# Patient Record
Sex: MALE | Race: WHITE
[De-identification: names, ages, dates, MRNs, and addresses within clinical notes are randomized per-mention and may not be internally consistent; named-entity substitution may affect disease eponyms.]

---
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
## 2019-12-13 ENCOUNTER — HOSPITAL ENCOUNTER (EMERGENCY)
Dept: HOSPITAL 74 - FER | Age: 19
Discharge: HOME | End: 2019-12-13
Payer: COMMERCIAL

## 2019-12-13 VITALS — DIASTOLIC BLOOD PRESSURE: 75 MMHG | HEART RATE: 72 BPM | SYSTOLIC BLOOD PRESSURE: 123 MMHG | TEMPERATURE: 98.5 F

## 2019-12-13 VITALS — BODY MASS INDEX: 25 KG/M2

## 2019-12-13 DIAGNOSIS — R10.84: Primary | ICD-10-CM

## 2019-12-13 DIAGNOSIS — Z88.0: ICD-10-CM

## 2019-12-13 LAB
ALBUMIN SERPL-MCNC: 3.8 G/DL (ref 3.4–5)
ALP SERPL-CCNC: 49 U/L (ref 45–117)
ALT SERPL-CCNC: 18 U/L (ref 13–61)
ANION GAP SERPL CALC-SCNC: 6 MMOL/L (ref 8–16)
AST SERPL-CCNC: 21 U/L (ref 15–37)
BASOPHILS # BLD: 0.4 % (ref 0–2)
BILIRUB SERPL-MCNC: 1.4 MG/DL (ref 0.2–1)
BUN SERPL-MCNC: 22 MG/DL (ref 7–18)
CALCIUM SERPL-MCNC: 8.6 MG/DL (ref 8.5–10)
CHLORIDE SERPL-SCNC: 105 MMOL/L (ref 98–107)
CO2 SERPL-SCNC: 24 MMOL/L (ref 21–32)
CREAT SERPL-MCNC: 1 MG/DL (ref 0.55–1.3)
DEPRECATED RDW RBC AUTO: 12.1 % (ref 11.9–15.9)
EOSINOPHIL # BLD: 0.4 % (ref 0–4.5)
GLUCOSE SERPL-MCNC: 81 MG/DL (ref 74–106)
HCT VFR BLD CALC: 50.6 % (ref 35.4–49)
HGB BLD-MCNC: 17 GM/DL (ref 11.7–16.9)
LYMPHOCYTES # BLD: 8.9 % (ref 8–40)
MCH RBC QN AUTO: 31.5 PG (ref 25.7–33.7)
MCHC RBC AUTO-ENTMCNC: 33.5 G/DL (ref 32–35.9)
MCV RBC: 94.1 FL (ref 80–96)
MONOCYTES # BLD AUTO: 9.6 % (ref 3.8–10.2)
NEUTROPHILS # BLD: 80.7 % (ref 42.8–82.8)
PLATELET # BLD AUTO: 194 K/MM3 (ref 134–434)
PMV BLD: 9.3 FL (ref 7.5–11.1)
POTASSIUM SERPLBLD-SCNC: 3.7 MMOL/L (ref 3.5–5.1)
PROT SERPL-MCNC: 6.4 G/DL (ref 6.4–8.2)
RBC # BLD AUTO: 5.38 M/MM3 (ref 4–5.6)
SODIUM SERPL-SCNC: 135 MMOL/L (ref 136–145)
WBC # BLD AUTO: 12.1 K/MM3 (ref 4–10.8)

## 2019-12-13 PROCEDURE — 3E033NZ INTRODUCTION OF ANALGESICS, HYPNOTICS, SEDATIVES INTO PERIPHERAL VEIN, PERCUTANEOUS APPROACH: ICD-10-PCS

## 2019-12-13 PROCEDURE — 3E033GC INTRODUCTION OF OTHER THERAPEUTIC SUBSTANCE INTO PERIPHERAL VEIN, PERCUTANEOUS APPROACH: ICD-10-PCS

## 2019-12-13 PROCEDURE — 3E0333Z INTRODUCTION OF ANTI-INFLAMMATORY INTO PERIPHERAL VEIN, PERCUTANEOUS APPROACH: ICD-10-PCS

## 2019-12-13 NOTE — PDOC
*Physical Exam





- Vital Signs


 Last Vital Signs











Temp Pulse Resp BP Pulse Ox


 


 98.5 F   72   15   123/75   98 


 


 12/13/19 19:30  12/13/19 19:30  12/13/19 19:30  12/13/19 19:30  12/13/19 19:30














ED Treatment Course





- LABORATORY


CBC & Chemistry Diagram: 


 12/13/19 15:45





 12/13/19 16:15





- ADDITIONAL ORDERS


Additional order review: 


 Laboratory  Results











  12/13/19 12/13/19





  16:15 16:15


 


Sodium   135 L


 


Potassium   3.7


 


Chloride   105


 


Carbon Dioxide   24


 


Anion Gap   6 L


 


BUN   22.0 H


 


Creatinine   1.0


 


Est GFR (CKD-EPI)AfAm   124.14


 


Est GFR (CKD-EPI)NonAf   107.11


 


Random Glucose   81


 


Calcium   8.6


 


Total Bilirubin   1.4 H


 


AST   21


 


ALT   18


 


Alkaline Phosphatase   49


 


Total Protein   6.4


 


Albumin   3.8


 


Lipase  93 








 











  12/13/19





  15:45


 


RBC  5.38


 


MCV  94.1


 


MCHC  33.5


 


RDW  12.1


 


MPV  9.3


 


Neutrophils %  80.7


 


Lymphocytes %  8.9  D


 


Monocytes %  9.6  D


 


Eosinophils %  0.4  D


 


Basophils %  0.4














- Medications


Given in the ED: 


ED Medications














Discontinued Medications














Generic Name Dose Route Start Last Admin





  Trade Name Freq  PRN Reason Stop Dose Admin


 


Al Hydroxide/Mg Hydroxide  30 ml  12/13/19 15:12  12/13/19 15:17





  Mylanta Oral Suspension -  PO  12/13/19 15:13  30 ml





  ONCE ONE   Administration





     





     





     





     


 


Famotidine/Sodium Chloride  20 mg in 50 mls @ 100 mls/hr  12/13/19 14:16  12/13/ 19 14:30





  Pepcid 20 Mg Premixed Ivpb -  IVPB  12/13/19 14:45  100 mls/hr





  ONCE ONE   Administration





     





     





     





     


 


Ketorolac Tromethamine  15 mg  12/13/19 15:35  12/13/19 15:50





  Toradol Injection -  IVPUSH  12/13/19 15:36  15 mg





  ONCE ONE   Administration





     





     





     





     


 


Morphine Sulfate  4 mg  12/13/19 17:59  12/13/19 18:20





  Morphine Injection -  IVPUSH  12/13/19 18:00  4 mg





  ONCE ONE   Administration





     





     





     





     


 


Ondansetron HCl  4 mg  12/13/19 18:12  12/13/19 18:15





  Zofran Injection  IVPUSH  12/13/19 18:13  4 mg





  ONCE ONE   Administration





     





     





     





     


 


Sodium Chloride  1,000 ml  12/13/19 14:16  12/13/19 14:30





  Normal Saline -  IV  12/13/19 14:17  1,000 ml





  ONCE ONE   Administration





     





     





     





     














ED Progress Note





- Progress Note


Progress Note: 





12/13/19 19:59


Care of this patient was transferred to me from Dr. Dugan at 1900 hrs.  

Patient is a 19-year-old male who comes in complaining of epigastric pain.  

Patient had a work-up that included a white count of 12.1 but no left shift.  

Patient's BUN and H&H were also somewhat elevated indicating he most likely was 

dry clinically


Patient received


A liter of fluid as well as Zofran and a H2 blocker.





Patient had a CAT scan that was read by the radiologist moderate fluid-filled 

stomach question recent food ingestion.  Increased fluid also within the colon 

and small bowel loops most likely secondary to a anterior colitis.





Patient symptoms most likely are secondary to a viral etiology.  Patient given 

copy of his CAT scan and discharged home.  Patient will follow-up with his 

primary care doctor.








Discharge





- Discharge Information


Problems reviewed: Yes


Clinical Impression/Diagnosis: 


 Viral gastroenteritis





Abdominal pain


Qualifiers:


 Abdominal location: generalized Qualified Code(s): R10.84 - Generalized 

abdominal pain





Condition: Good





- Admission


No





- Follow up/Referral


Referrals: 


Chelsea Urias [Primary Care Provider] - 





- Patient Discharge Instructions


Additional Instructions: 


For the pain take Tylenol 2 extra strength tablets as often as every 6 hours as 

needed.





For nausea take Zofran 1 tablet every 6-8 hours as needed I sent a prescription 

to your pharmacy for the Zofran.





If not improved within 48 hours follow-up with a primary care doctor.





Return to the emergency department immediately with ANY new, persistent or 

worsening symptoms.





Continue any medications as previously prescribed by your physician.





You should follow up with your primary doctor as soon as possible regarding 

today's emergency department visit.


.


Please make sure your doctor reviews the results of your emergency evaluation.





Thank you for coming to the   Emergency Department today for your care. It was 

a pleasure to see you today. Please note that your evaluation is INCOMPLETE 

until you  follow-up with your doctor. 





- Post Discharge Activity


Work/Back to School Note:  Back to School

## 2019-12-13 NOTE — PDOC
Documentation entered by Zeke Motley SCRIBE, acting as scribe for Kings Dugan MD.








Kings Dugan MD:  This documentation has been prepared by the Tolu medel Aiswarya, SCRIBE, under my direction and personally reviewed by me in 

its entirety.  I confirm that the documentation accurately reflects all work, 

treatment, procedures, and medical decision making performed by me.  





History of Present Illness





- General


Chief Complaint: Pain


Stated Complaint: STOMACH PAIN


Time Seen by Provider: 12/13/19 14:16


History Source: Patient


Exam Limitations: No Limitations





- History of Present Illness


Initial Comments: 





12/13/19 14:31





The patient is a 19 year old female, with no  significant PMH,  who presents to 

the emergency department with abdominal pain that began 4 hours ago. The 

patient states abdominal pain is located to the epigastric area and endorses 

associated symptoms of bloating and nausea. He also reports only having eggs 

and coffee today. Patient mentions that he had a similar pain one year ago and  

was seen by a gastroenterologist. The patient denies chest pain, shortness of 

breath, headache and dizziness. Denies fever, chills, vomiting, diarrhea and 

constipation.Denies dysuria, frequency, urgency and hematuria.





Allergies: NKDA


Past surgical history:None reported 


Social history: None reported


PCP: Chelsea Urias 








Past History





- Past Medical History


Allergies/Adverse Reactions: 


 Allergies











Allergy/AdvReac Type Severity Reaction Status Date / Time


 


Penicillins Allergy  Hives Verified 12/13/19 14:14











Home Medications: 


Ambulatory Orders





NK [No Known Home Medication]  12/13/19 








Asthma: No


Diabetes: No





- Immunization History


Immunization Up to Date: Yes





- Psycho Social/Smoking Cessation Hx


Smoking Status: No


Smoking History: Never smoked


Have you smoked in the past 12 months: No


Number of Cigarettes Smoked Daily: 0


Hx Alcohol Use: No


Drug/Substance Use Hx: No


Substance Use Type: None





**Review of Systems





- Review of Systems


Able to Perform ROS?: Yes


Comments:: 





12/13/19 14:31


GENERAL/CONSTITUTIONAL: No fever or chills. No weakness.


HEAD, EYES, EARS, NOSE AND THROAT: No change in vision. No ear pain or 

discharge. No sore throat.


CARDIOVASCULAR: No chest pain or shortness of breath.


RESPIRATORY: No cough, wheezing, or hemoptysis.


GASTROINTESTINAL: +nausea. No vomiting, diarrhea or constipation.


GENITOURINARY: No dysuria, frequency, or change in urination.


MUSCULOSKELETAL: No joint or muscle swelling or pain. No neck or back pain.


SKIN: No rash


NEUROLOGIC: No headache, vertigo, loss of consciousness, or change in strength/

sensation.


ENDOCRINE: No increased thirst. No abnormal weight change.


HEMATOLOGIC/LYMPHATIC: No anemia, easy bleeding, or history of blood clots.


ALLERGIC/IMMUNOLOGIC: No hives or skin allergy.








*Physical Exam





- Vital Signs


 Last Vital Signs











Temp Pulse Resp BP Pulse Ox


 


 98.0 F   76   16   140/100   98 


 


 12/13/19 14:12  12/13/19 14:12  12/13/19 14:12  12/13/19 14:12  12/13/19 14:12














- Physical Exam





12/13/19 14:31


GENERAL: Awake, alert, and fully oriented, in no acute distress


HEAD: No signs of trauma


EYES: PERRLA, EOMI, sclera anicteric, conjunctiva clear


ENT: Auricles normal inspection, hearing grossly normal, nares patent, 

oropharynx clear without exudates. Moist mucosa


NECK: Normal ROM, supple, no lymphadenopathy, JVD, or masses


LUNGS: Breath sounds equal, clear to auscultation bilaterally.  No wheezes, and 

no crackles


HEART: Regular rate and rhythm, normal S1 and S2, no murmurs, rubs or gallops


ABDOMEN: +epigastric tenderness. No guarding, no rebound.  No masses


EXTREMITIES: Normal range of motion, no edema.  No clubbing or cyanosis. No 

cords, erythema, or tenderness


NEUROLOGICAL: Cranial nerves II through XII grossly intact.  Normal speech, 

normal gait


SKIN: Warm, Dry, normal turgor, no rashes or lesions noted.











ED Treatment Course





- LABORATORY


CBC & Chemistry Diagram: 


 12/13/19 15:45





 12/13/19 16:15





- ADDITIONAL ORDERS


Additional order review: 


 Laboratory  Results











  12/13/19 12/13/19





  16:15 16:15


 


Sodium   135 L


 


Potassium   3.7


 


Chloride   105


 


Carbon Dioxide   24


 


Anion Gap   6 L


 


BUN   22.0 H


 


Creatinine   1.0


 


Est GFR (CKD-EPI)AfAm   124.14


 


Est GFR (CKD-EPI)NonAf   107.11


 


Random Glucose   81


 


Calcium   8.6


 


Total Bilirubin   1.4 H


 


AST   21


 


ALT   18


 


Alkaline Phosphatase   49


 


Total Protein   6.4


 


Albumin   3.8


 


Lipase  93 








 











  12/13/19





  15:45


 


RBC  5.38


 


MCV  94.1


 


MCHC  33.5


 


RDW  12.1


 


MPV  9.3


 


Neutrophils %  80.7


 


Lymphocytes %  8.9  D


 


Monocytes %  9.6  D


 


Eosinophils %  0.4  D


 


Basophils %  0.4














- RADIOLOGY


Radiology Studies Ordered: 














 Category Date Time Status


 


 ABDOMEN FLAT & UPRIGHT [RAD] Stat Radiology  12/13/19 18:00 Ordered














- Medications


Given in the ED: 


ED Medications














Discontinued Medications














Generic Name Dose Route Start Last Admin





  Trade Name Bertin  PRN Reason Stop Dose Admin


 


Al Hydroxide/Mg Hydroxide  30 ml  12/13/19 15:12  12/13/19 15:17





  Mylanta Oral Suspension -  PO  12/13/19 15:13  30 ml





  ONCE ONE   Administration





     





     





     





     


 


Famotidine/Sodium Chloride  20 mg in 50 mls @ 100 mls/hr  12/13/19 14:16  12/13/ 19 14:30





  Pepcid 20 Mg Premixed Ivpb -  IVPB  12/13/19 14:45  100 mls/hr





  ONCE ONE   Administration





     





     





     





     


 


Ketorolac Tromethamine  15 mg  12/13/19 15:35  12/13/19 15:50





  Toradol Injection -  IVPUSH  12/13/19 15:36  15 mg





  ONCE ONE   Administration





     





     





     





     


 


Morphine Sulfate  4 mg  12/13/19 17:59  12/13/19 18:20





  Morphine Injection -  IVPUSH  12/13/19 18:00  4 mg





  ONCE ONE   Administration





     





     





     





     


 


Ondansetron HCl  4 mg  12/13/19 18:12  12/13/19 18:15





  Zofran Injection  IVPUSH  12/13/19 18:13  4 mg





  ONCE ONE   Administration





     





     





     





     


 


Sodium Chloride  1,000 ml  12/13/19 14:16  12/13/19 14:30





  Normal Saline -  IV  12/13/19 14:17  1,000 ml





  ONCE ONE   Administration





     





     





     





     














Medical Decision Making





- Medical Decision Making





12/13/19 18:33


location most c/w gastritis but failed to respond to pepcid and maalox.


IV opioids


image   





Discharge





- Discharge Information


Problems reviewed: Yes


Clinical Impression/Diagnosis: 


Abdominal pain


Qualifiers:


 Abdominal location: generalized Qualified Code(s): R10.84 - Generalized 

abdominal pain





Condition: Good





- Follow up/Referral


Referrals: 


Chelsea Urias [Primary Care Provider] - 





- Patient Discharge Instructions





- Post Discharge Activity

## 2020-12-23 ENCOUNTER — APPOINTMENT (OUTPATIENT)
Dept: GASTROENTEROLOGY | Facility: CLINIC | Age: 20
End: 2020-12-23
Payer: COMMERCIAL

## 2020-12-23 VITALS
HEART RATE: 91 BPM | DIASTOLIC BLOOD PRESSURE: 78 MMHG | TEMPERATURE: 97.1 F | SYSTOLIC BLOOD PRESSURE: 116 MMHG | BODY MASS INDEX: 25.83 KG/M2 | WEIGHT: 155 LBS | HEIGHT: 65 IN

## 2020-12-23 PROBLEM — Z00.00 ENCOUNTER FOR PREVENTIVE HEALTH EXAMINATION: Status: ACTIVE | Noted: 2020-12-23

## 2020-12-23 PROCEDURE — 36415 COLL VENOUS BLD VENIPUNCTURE: CPT

## 2020-12-23 PROCEDURE — 99204 OFFICE O/P NEW MOD 45 MIN: CPT | Mod: 25

## 2020-12-23 PROCEDURE — 99072 ADDL SUPL MATRL&STAF TM PHE: CPT

## 2020-12-23 NOTE — HISTORY OF PRESENT ILLNESS
[FreeTextEntry1] : 20m no sig PMH presents for 2mo hx postprandial epigastric discomfort, most days of wk,  w/o n/v. Worse w/ lactose, possibly w/ his protein workout shakes. No heartburn, no RUQ pain, No n/v.  Feels well otherwise.  No diarrhea/constipation.  No fat intolerance.  NSAID once weekly.\par \par Soc:  no tobacco or significant EtOH\par FHx: no FHx GI malignancy or IBD\par \par ROS:\par Constitutional:: no weight loss, fevers\par ENT: no deafness\par Eyes: not blind\par Neck: no LN\par Chest: no dyspnea/cough\par Cardiac: no chest pain\par Vascular: no leg swelling\par GI: no abdominal pain, nausea, vomiting, diarrhea, constipation, rectal bleeding, dysphagia, melena unless otherwise noted in HPI\par : no dysuria, dark urine\par Skin: no rashes, jaundice\par Heme: no bleeding\par Endocrine: no DM unless otherwise stated in HPI\par \par Px: (VS noted below)\par General: NAD\par Eyes: anicteric\par Oropharynx:  clear\par Neck: no LN\par Chest: normal respiratory effort\par CVS: regular\par Abd: soft, NT, ND, +BS, no HSM\par Ext: no atrophy\par Neuro: grossly nonfocal\par \par Labs/imaging/prior endoscopic results reviewed to the extent available and noted in HPI\par

## 2020-12-23 NOTE — ASSESSMENT
[FreeTextEntry1] : -possibly PUD - vs biliary - will check labs, trial PPI and if no improvement may move toward US or EGD. F/U 4-6 wks.  If sx resolve, would check h. pylori and tx if positive.  Cut back on NSAIDs\par

## 2021-01-03 LAB
ALBUMIN SERPL ELPH-MCNC: 4.8 G/DL
ALP BLD-CCNC: 83 U/L
ALT SERPL-CCNC: 13 U/L
ANION GAP SERPL CALC-SCNC: 15 MMOL/L
AST SERPL-CCNC: 19 U/L
BASOPHILS # BLD AUTO: 0.02 K/UL
BASOPHILS NFR BLD AUTO: 0.4 %
BILIRUB SERPL-MCNC: 0.9 MG/DL
BUN SERPL-MCNC: 24 MG/DL
CALCIUM SERPL-MCNC: 9.8 MG/DL
CHLORIDE SERPL-SCNC: 101 MMOL/L
CO2 SERPL-SCNC: 23 MMOL/L
CREAT SERPL-MCNC: 1.39 MG/DL
EOSINOPHIL # BLD AUTO: 0.05 K/UL
EOSINOPHIL NFR BLD AUTO: 1.1 %
GLUCOSE SERPL-MCNC: 79 MG/DL
HCT VFR BLD CALC: 50.4 %
HGB BLD-MCNC: 16.7 G/DL
IGA SER QL IEP: 219 MG/DL
IMM GRANULOCYTES NFR BLD AUTO: 0.2 %
LPL SERPL-CCNC: 23 U/L
LYMPHOCYTES # BLD AUTO: 1.22 K/UL
LYMPHOCYTES NFR BLD AUTO: 25.6 %
MAN DIFF?: NORMAL
MCHC RBC-ENTMCNC: 30.1 PG
MCHC RBC-ENTMCNC: 33.1 GM/DL
MCV RBC AUTO: 90.8 FL
MONOCYTES # BLD AUTO: 0.5 K/UL
MONOCYTES NFR BLD AUTO: 10.5 %
NEUTROPHILS # BLD AUTO: 2.96 K/UL
NEUTROPHILS NFR BLD AUTO: 62.2 %
PLATELET # BLD AUTO: 203 K/UL
POTASSIUM SERPL-SCNC: 4.2 MMOL/L
PROT SERPL-MCNC: 7.2 G/DL
RBC # BLD: 5.55 M/UL
RBC # FLD: 11.5 %
SODIUM SERPL-SCNC: 138 MMOL/L
TTG IGA SER IA-ACNC: <1.2 U/ML
TTG IGA SER-ACNC: NEGATIVE
TTG IGG SER IA-ACNC: 1.4 U/ML
TTG IGG SER IA-ACNC: NEGATIVE
WBC # FLD AUTO: 4.76 K/UL

## 2021-02-03 ENCOUNTER — APPOINTMENT (OUTPATIENT)
Dept: GASTROENTEROLOGY | Facility: CLINIC | Age: 21
End: 2021-02-03
Payer: COMMERCIAL

## 2021-02-03 VITALS
HEIGHT: 66 IN | DIASTOLIC BLOOD PRESSURE: 84 MMHG | TEMPERATURE: 96.1 F | SYSTOLIC BLOOD PRESSURE: 166 MMHG | WEIGHT: 155 LBS | HEART RATE: 87 BPM | OXYGEN SATURATION: 96 % | BODY MASS INDEX: 24.91 KG/M2

## 2021-02-03 DIAGNOSIS — R10.13 EPIGASTRIC PAIN: ICD-10-CM

## 2021-02-03 PROCEDURE — 99213 OFFICE O/P EST LOW 20 MIN: CPT

## 2021-02-03 PROCEDURE — 99072 ADDL SUPL MATRL&STAF TM PHE: CPT

## 2021-02-03 RX ORDER — OMEPRAZOLE 40 MG/1
40 CAPSULE, DELAYED RELEASE ORAL
Qty: 30 | Refills: 1 | Status: DISCONTINUED | COMMUNITY
Start: 2020-12-23 | End: 2021-02-03

## 2021-02-03 NOTE — HISTORY OF PRESENT ILLNESS
[FreeTextEntry1] : 20m here for f/u of hx postprandial epigastric discomfort, most days of wk x 2mo prior to last visit 6 wks ago.\par \par Sx resolved on ppi. \par CBC, CMET, lipase all WNL exc creatinine slightly up - advised f/u w/ PMD.\par \par BMs normal.\par \par No NSAIDS.\par \par Thinks his protein workout shakes were not helping sx either - stopped.\par \par Soc:  no tobacco or significant EtOH\par FHx: no FHx GI malignancy or IBD\par \par ROS:\par Constitutional:: no weight loss, fevers\par ENT: no deafness\par Eyes: not blind\par Neck: no LN\par Chest: no dyspnea/cough\par Cardiac: no chest pain\par Vascular: no leg swelling\par GI: no abdominal pain, nausea, vomiting, diarrhea, constipation, rectal bleeding, dysphagia, melena unless otherwise noted in HPI\par : no dysuria, dark urine\par Skin: no rashes, jaundice\par Heme: no bleeding\par Endocrine: no DM unless otherwise stated in HPI\par \par Px: (VS noted below)\par General: NAD\par Eyes: anicteric\par Oropharynx:  clear\par Neck: no LN\par Chest: normal respiratory effort\par CVS: regular\par Abd: soft, NT, ND, +BS, no HSM\par Ext: no atrophy\par Neuro: grossly nonfocal\par \par Labs/imaging/prior endoscopic results reviewed to the extent available and noted in HPI\par

## 2021-02-03 NOTE — ASSESSMENT
[FreeTextEntry1] : -possibly PUD vs gastritis - resolved - now off ppi x 2 wks - will check h. pylori and tx if positive. F/U if recurs.\par -f/u with PMD RE: Creatinine and BP

## 2022-02-02 ENCOUNTER — APPOINTMENT (OUTPATIENT)
Dept: PODIATRY | Facility: CLINIC | Age: 22
End: 2022-02-02

## 2023-09-01 ENCOUNTER — NON-APPOINTMENT (OUTPATIENT)
Age: 23
End: 2023-09-01

## 2023-09-01 ENCOUNTER — APPOINTMENT (OUTPATIENT)
Dept: PAIN MANAGEMENT | Facility: CLINIC | Age: 23
End: 2023-09-01
Payer: COMMERCIAL

## 2023-09-01 VITALS
SYSTOLIC BLOOD PRESSURE: 131 MMHG | BODY MASS INDEX: 27.97 KG/M2 | WEIGHT: 174 LBS | HEIGHT: 66 IN | DIASTOLIC BLOOD PRESSURE: 73 MMHG

## 2023-09-01 DIAGNOSIS — Z78.9 OTHER SPECIFIED HEALTH STATUS: ICD-10-CM

## 2023-09-01 PROCEDURE — 99204 OFFICE O/P NEW MOD 45 MIN: CPT

## 2023-09-01 NOTE — ASSESSMENT
[FreeTextEntry1] : >> Imaging and Other Studies  XR LS to evaluate pathology  back and leg pain likely secondary to lumbar radiculopathy and discogenic pain refractory to conservative treatments including 6 consecutive weeks of home exercises/PT, will obtain MRI LS to evaluate for pathology  may consider PT vs intervention pending eval  >> Therapy and Other Modalities  consider PT  >> Medications   acetaminophen 650mg q8h prn pain (caution <3g daily) >> Interventions  na  >> Consults  na  >> Discussion of Risks/Benefits/Alternatives  	>Regarding any scheduled procedures:  I have discussed in detail with the patient that any interventional pain procedure is associated with potential risks.  The procedure may include an injection of steroids and potentially other medications (local anesthetic and normal saline) into the epidural space or surrounding tissue of the spine.  There are significant risks of this procedure which include and are not limited to infection, bleeding, worsening pain, dural puncture leading to postdural puncture headache, nerve damage, spinal cord injury, paralysis, stroke, and death.    There is a chance that the procedure does not improve their pain.    There are risks associated with the steroid being absorbed into the body systemically.  These include dysphoria, difficulty sleeping, mood swings and personality changes.  Premenopausal women may notice an irregularity in her menstrual cycle for 2-3 months following the injection.  Steroids can specifically affect patients with hypertension, diabetes, and peptic ulcers.  The procedure may cause a temporary increase in blood pressure and blood pressure, and may adversely affect a peptic ulcer.  Other, more rare complications, include avascular necrosis of joints, glaucoma and worsening of osteoporosis.   I have discussed the risks of the procedure at length with the patient, and the potential benefits of pain relief.  I have offered alternatives to the procedure.  All questions were answered.    The patient expressed understanding and wishes to proceed with the procedure.  	>Regarding COVID19 Pandemic:   Any planned interventional pain procedure are scheduled because further delay may cause harm or negative outcome to patient.  The goal in performing this procedure is to avoid deterioration of function, emergency room visits (which increases exposure) and reliance on opioids.    r/b/a discussed with patient, lack of evidence to conclusively determine whether pain management procedures have any positive or negative impact on the possibility of janis the virus and/or development of any sequelae.   Patient counselled regarding timing steroid based intervention 2 weeks before or after COVID-19 vaccine administration to avoid any interaction or affect on efficacy of vaccination  Patient demonstrates understanding  Informed patient that risks associated with the COVID-19 infection.  Informed patient steps taken to limit the risks.  We are implementing safety precautions and following protocols consistent with the CDC and state recommendations. All patients and staff will be checked for fever or signs of illness upon entry to the facility. We will limit our steroid dose to the lowest effective therapeutic dose or in some cases steroids will not be injected at all.   Patient agrees to proceed  >> Conclusion  The above diagnosis and treatment plan is medically reasonable and necessary based on the patient encounter  There were no barriers to communication. Informed patient that I would be available for any additional questions. Patient was instructed to call with any worsening symptoms including severe pain, new numbness/weakness, or changes in the bowel/bladder function.  Discussed role of nsaids in pain management and all relevant risks, if patient is continuing to require after 4 weeks the patient should f/u for alternative treatment.  Instructed patient to maintain pain diary to monitor pain level, mobility, and function.

## 2023-09-01 NOTE — PHYSICAL EXAM
[Normal muscle bulk without asymmetry] : normal muscle bulk without asymmetry [Facet Tenderness] : no facet tenderness [] : Motor: [Normal] : Normal affect upright (90 degrees)

## 2023-09-01 NOTE — HISTORY OF PRESENT ILLNESS
[Back Pain] : back pain [___ mths] : [unfilled] month(s) ago [Constant] : constant [7] : a minimum pain level of 7/10 [9] : a maximum pain level of 9/10 [Sharp] : sharp [Stabbing] : stabbing [Shooting] : shooting [Electric] : electric [Bending] : bending [Medications] : medications [Other: ___] : [unfilled] [FreeTextEntry1] : HPI     Mr. MEGHANN DOE is a 22 year M with no pmhx. Very active into weightlifting. In December began to have severe lower back pain that radiates to left thigh posteriorly. No relief with chiropractor treatments. Did physical therapy 3 months ago with no relief and noted that symptoms worsened. Ibuprofen with no relief. Unable to perform ADL's due to pain. Denies any additional weakness, numbness, bowel/bladder dysfunction, history of bleeding disorders.   Previous and current pain medications/doses/effects: Ibuprofen      Previous Pain Treatments: None     Previous Pain Injections: PT, Chiropractor     Previous Diagnostic Studies/Images: None      [FreeTextEntry7] : Lower back pain, radiating down left buttock [FreeTextEntry4] : ibuprofen

## 2023-09-29 ENCOUNTER — APPOINTMENT (OUTPATIENT)
Dept: PAIN MANAGEMENT | Facility: CLINIC | Age: 23
End: 2023-09-29
Payer: COMMERCIAL

## 2023-09-29 VITALS
SYSTOLIC BLOOD PRESSURE: 139 MMHG | DIASTOLIC BLOOD PRESSURE: 83 MMHG | WEIGHT: 174 LBS | BODY MASS INDEX: 27.97 KG/M2 | HEIGHT: 66 IN

## 2023-09-29 DIAGNOSIS — M79.2 NEURALGIA AND NEURITIS, UNSPECIFIED: ICD-10-CM

## 2023-09-29 DIAGNOSIS — M54.16 RADICULOPATHY, LUMBAR REGION: ICD-10-CM

## 2023-09-29 DIAGNOSIS — M51.26 OTHER INTERVERTEBRAL DISC DISPLACEMENT, LUMBAR REGION: ICD-10-CM

## 2023-09-29 DIAGNOSIS — M54.50 LOW BACK PAIN, UNSPECIFIED: ICD-10-CM

## 2023-09-29 DIAGNOSIS — M79.18 MYALGIA, OTHER SITE: ICD-10-CM

## 2023-09-29 DIAGNOSIS — G89.4 CHRONIC PAIN SYNDROME: ICD-10-CM

## 2023-09-29 PROCEDURE — 99214 OFFICE O/P EST MOD 30 MIN: CPT

## 2023-09-29 RX ORDER — TIZANIDINE 2 MG/1
2 TABLET ORAL
Qty: 45 | Refills: 1 | Status: ACTIVE | COMMUNITY
Start: 2023-09-29 | End: 1900-01-01

## 2023-11-03 ENCOUNTER — APPOINTMENT (OUTPATIENT)
Dept: PAIN MANAGEMENT | Facility: CLINIC | Age: 23
End: 2023-11-03